# Patient Record
Sex: MALE | Race: WHITE | Employment: OTHER | URBAN - METROPOLITAN AREA
[De-identification: names, ages, dates, MRNs, and addresses within clinical notes are randomized per-mention and may not be internally consistent; named-entity substitution may affect disease eponyms.]

---

## 2017-09-12 ENCOUNTER — HOSPITAL ENCOUNTER (OUTPATIENT)
Dept: GENERAL RADIOLOGY | Age: 82
Discharge: HOME | End: 2017-09-12
Attending: SPECIALIST
Payer: MEDICARE

## 2017-09-12 DIAGNOSIS — R13.10 DYSPHAGIA: ICD-10-CM

## 2017-09-12 PROCEDURE — 74220 X-RAY XM ESOPHAGUS 1CNTRST: CPT

## 2017-09-15 ENCOUNTER — HOSPITAL ENCOUNTER (OUTPATIENT)
Dept: GENERAL RADIOLOGY | Age: 82
Discharge: HOME | End: 2017-09-15
Attending: SPECIALIST
Payer: MEDICARE

## 2017-09-15 DIAGNOSIS — R13.12 OROPHARYNGEAL DYSPHAGIA: ICD-10-CM

## 2017-09-15 DIAGNOSIS — R13.10 DYSPHAGIA: ICD-10-CM

## 2017-09-15 PROCEDURE — G8997 SWALLOW GOAL STATUS: HCPCS

## 2017-09-15 PROCEDURE — 92611 MOTION FLUOROSCOPY/SWALLOW: CPT

## 2017-09-15 PROCEDURE — G8996 SWALLOW CURRENT STATUS: HCPCS

## 2017-09-15 PROCEDURE — 74230 X-RAY XM SWLNG FUNCJ C+: CPT

## 2017-09-15 PROCEDURE — G8998 SWALLOW D/C STATUS: HCPCS

## 2017-09-15 NOTE — PROGRESS NOTES
[address]  [address] [facility], [address]    Speech Pathology Modified barium swallow Study with cms g codes  Patient: Waldemar Manzo (60 y.o. male)  Date: 9/15/2017  Referring Provider:  Hany Pascal    SUBJECTIVE:   Patient had minimal concerns about his swallowing. He admitted that sometimes he chokes on foods, but was unable to quantify frequency of choking. OBJECTIVE:   Past Medical History:   Past Medical History:   Diagnosis Date    Abdominal aneurysm without mention of rupture ([address] 4/11/2011    Cancer ([address]     skin ca    CKD (chronic kidney disease), stage III 8/25/2012    GERD (gastroesophageal reflux disease) 8/3/2011    Other and unspecified hyperlipidemia 4/11/2011    Pre-operative cardiovascular examination 4/11/2011    Unspecified essential hypertension 4/11/2011     Past Surgical History:   Procedure Laterality Date    HX CHOLECYSTECTOMY      NEUROLOGICAL PROCEDURE UNLISTED      subddural hematoma 2014 [facility]    VASCULAR SURGERY PROCEDURE UNLIST      [facility] ENDOGRAFT     Current Dietary Status:  Regular, thins  Radiologist: Ronald Lazo  Film Views: Lateral  Patient Position: upright in Hausted chair    Trial 1: Trial 2:   Consistency Presented: Thin liquid;Puree; Solid;Pill/Tablet     How Presented: Self-fed/presented;Cup/gulp; Spoon;Straw;Successive swallows      ORAL PHASE:      Bolus Acceptance: No impairment     Bolus Formation/Control: Impaired: Piecemeal (wiht purees and solids)  :           Oral Residue: Lingual (due to piecemeal deglutition)   PHARYNGEAL PHASE:      Initiation of Swallow: Triggered at vallecula (almost to pyriform sinuses with thins)     Timing: Pooling 1-5 sec     Penetration: Trace;During swallow (due to mild delay in swallow), Patient with delayed throat clearing with thins. Aspiration/Timing:  (aspirated thins in moderate amounts when trying to take a pill with water due to incoordinaton of pills vs water). Patient had coughing s/p aspiration. Patient taking large amounts of water with pill, he filled the valleculae with water and tilted his head back. This probably increased his aspiration risk. Pharyngeal Clearance: Vallecular residue;Pyriform residue ;10-50% (moderate -worse with purees, solids. poor awareness of pharyngeal residue; also wiht mild posterior pharyngeal wall residue)   Residue was worse in pyriform than valleculae and cleared less from pyriforms with liquids. Patient had poor awareness of pharyngeal residue     Attempted Modifications: Alternate liquids/solids     Effective Modifications: Alternate liquids/solids (helped reduce, but did not eliminate )     Cues for Modifications: Minimal-moderate             Trial 3: Trial 4:    ESOPHAGEAL PHASE:   He had barium swallow 9-12-17, which showed moderate reflux. This is an additional aspiration risk factor for him and may increase his sensory issues in pharynx. :    :                                                                        Decreased Tongue Base Retraction?: Yes  Laryngeal Elevation: Reduced excursion with laryngeal vestibule gap; Incomplete laryngeal closure  Aspiration/Penetration Score: 7 (Aspiration-Contrast passes below the cords/, but is not ejected despite attempt)  Pharyngeal Symmetry: Symmetrical  Pharyngeal-Esophageal Segment: Decreased relaxation of upper esophageal segment (some of this may be due to impaired subglottic pressure, from reduced laryngeal elevation and excursion)  Pharyngeal Dysfunction: Decreased tongue base retraction;Decreased pharyngeal wall constriction;Decreased strength            In compliance with CMSs Claims Based Outcome Reporting, the following G-code set was chosen for this patient based the use of the NOMS functional outcome to quantify this patient's level of swallowing impairment.     Using the NOMS, the patient was determined to be at level 5 for swallow function which correlates with the CJ= 20-39% level of severity. Based on the objective assessment provided within this note, the current, goal, and discharge g-codes are as follows:    Swallow  Swallowing:   Swallow Current Status CJ= 20-39%   Swallow Goal Status CJ= 20-39%   Swallow D/C Status CJ= 20-39%        NOMS Swallowing Levels:  Level 1 (CN): NPO  Level 2 (CM): NPO but takes consistency in therapy  Level 3 (CL): Takes less than 50% of nutrition p.o. and continues with nonoral feedings; and/or safe with mod cues; and/or max diet restriction  Level 4 (CK): Safe swallow but needs mod cues; and/or mod diet restriction; and/or still requires some nonoral feeding/supplements  Level 5 (CJ): Safe swallow with min diet restriction; and/or needs min cues  Level 6 (CI): Independent with p.o.; rare cues; usually self cues; may need to avoid some foods or needs extra time  Level 7 ([address] [place] [address]): Independent for all p.o.  RAVEN. (2003). National Outcomes Measurement System (NOMS): Adult Speech-Language Pathology User's Guide. ASSESSMENT :  Based on the objective data described above, the patient presents with moderate pharyngeal dysphagia with mild delay and moderate weakness. Pharyngeal residue was moderate to severe with thicker items and increased his aspiration risk. He aspirated on thin liquids when taking a pills, as he had difficulty coordinating the swallow of a liquids and solid at the same time. He had coughing with aspiration, but poor pharyngeal sensation for pharyngeal residue. Cup sipping was an increased aspiration risk over straw sips. He had mild oral propulsion issues as well with piecemeal deglutition. Documented h/o reflux complicates aspiration risk as well. .  He had poor awareness of dysphagia and minimal concern for choking. PLAN/RECOMMENDATIONS :  Diet as tolerated. He should use straw instead of cup sips. He should not drink with food in the mouth.    He should alternate swallows of drinks and foods. HE needs OP SLP for swallowing therapy to address strength of swallow (BOT,laryngeal elevation), safe swallowing strategies, education about aspiration risks, family education. COMMUNICATION/EDUCATION:   The above findings and recommendations were discussed with: patient who verbalized reduced understanding of his aspiration risks  .     Thank you for this referral.Suki Ann, SLP  Time Calculation: 20 mins

## 2022-11-17 ENCOUNTER — TRANSCRIBE ORDER (OUTPATIENT)
Dept: SCHEDULING | Age: 87
End: 2022-11-17

## 2022-11-17 DIAGNOSIS — R13.12 DYSPHAGIA, OROPHARYNGEAL PHASE: Primary | ICD-10-CM

## 2022-11-17 DIAGNOSIS — K22.4 ESOPHAGEAL DYSMOTILITY: ICD-10-CM

## 2022-12-12 ENCOUNTER — HOSPITAL ENCOUNTER (OUTPATIENT)
Dept: GENERAL RADIOLOGY | Age: 87
Discharge: HOME | End: 2022-12-12
Attending: SPECIALIST
Payer: MEDICARE

## 2022-12-12 DIAGNOSIS — K22.4 ESOPHAGEAL DYSMOTILITY: ICD-10-CM

## 2022-12-12 DIAGNOSIS — R13.12 DYSPHAGIA, OROPHARYNGEAL PHASE: ICD-10-CM

## 2022-12-12 PROCEDURE — 92611 MOTION FLUOROSCOPY/SWALLOW: CPT

## 2022-12-12 PROCEDURE — 74230 X-RAY XM SWLNG FUNCJ C+: CPT

## 2022-12-12 NOTE — PROGRESS NOTES
[facility]  [address], [address] [facility] STUDY  Patient: Sohail Barcenas (42 y.o. male)  Date: 12/12/2022  Referring Provider:  Christina Myles:   Patient's caregiver and wife attended session. They report increasing dysphagia. They just started taking away bread and meats b/c of too much choking. He has lost 20 lbs in the last year. They already provide protein shakes. SLP completed an MBS in 2017, which already showed significant dysphagia:  2017 note:  ASSESSMENT :  Based on the objective data described above, the patient presents with moderate pharyngeal dysphagia with mild delay and moderate weakness. Pharyngeal residue was moderate to severe with thicker items and increased his aspiration risk. He aspirated on thin liquids when taking a pills, as he had difficulty coordinating the swallow of a liquids and solid at the same time. He had coughing with aspiration, but poor pharyngeal sensation for pharyngeal residue. Cup sipping was an increased aspiration risk over straw sips. He had mild oral propulsion issues as well with piecemeal deglutition. Documented h/o reflux complicates aspiration risk as well. .  He had poor awareness of dysphagia and minimal concern for choking. PLAN/RECOMMENDATIONS :  Diet as tolerated. He should use straw instead of cup sips. He should not drink with food in the mouth. He should alternate swallows of drinks and foods. HE needs OP SLP for swallowing therapy to address strength of swallow (BOT,laryngeal elevation), safe swallowing strategies, education about aspiration risks, family education.       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      OBJECTIVE:   Past Medical History:   Past Medical History:   Diagnosis Date Abdominal aneurysm without mention of rupture ([facility] 4/11/2011    Cancer ([facility]     skin ca    CKD (chronic kidney disease), stage III 8/25/2012    GERD (gastroesophageal reflux disease) 8/3/2011    Other and unspecified hyperlipidemia 4/11/2011    Pre-operative cardiovascular examination 4/11/2011    Unspecified essential hypertension 4/11/2011     Past Surgical History:   Procedure Laterality Date    HX CHOLECYSTECTOMY      NEUROLOGICAL PROCEDURE UNLISTED      [facility]    VASCULAR SURGERY PROCEDURE UNLIST      [facility] ENDOGRAFT     Current Dietary Status:  soft, thins  Radiologist: upright in bed  Film Views: Lateral  Patient Position: upright in Hausted chair    Trial 1: Trial 2:   Consistency Presented: Thin liquid;Puree; Solid; Nectar thick liquid;Pill/Tablet; Honey thick liquid     How Presented: SLP-fed/presented;Spoon;Straw;Successive swallows      ORAL PHASE:      Bolus Acceptance: No impairment     Bolus Formation/Control: No impairment:    :     Propulsion: No impairment     Oral Residue: None  PHARYNGEAL PHASE:      Initiation of Swallow: Triggered at pyriform sinus(es)     Timing: Pooling 1-5 sec     Penetration:  (deep pentration in large amounts with thin liquids to the cords. a little stayed on the cords). Also had penetration after the cords from pharyngeal/vallecular residue  in trace amounts  He had some throat clearing reaction to penetration, which cleared 95% of it     Aspiration/Timing:  (trace amounts of aspiration intermittently from deep penetration with thins or from residue in valleculae with thicker items. ) More aspiration with thins than nectars. Pharyngeal Clearance: Vallecular residue;Pyriform residue with purees, he had severe residue throughout the pharynx in both upper or lower. He has reduced coordination of pharyngeal peristalsis with some to and fro in pharynx. Less residue with honey thick liquids than purees. Attempted Modifications:  Alternate liquids/solids; Chin tuck;Effortful swallow;Left head turn;Right head turn;Small sips and bites;Straw                         Trial 3: Trial 4:                            :    :                                                                        Decreased Tongue Base Retraction?: Yes  Laryngeal Elevation: Inadequate epiglottic inversion; Reduced excursion with laryngeal vestibule gap  Aspiration/Penetration Score: 8 (Aspiration-Contrast passes cords/glottis with no effort to eject, ie/silent aspiration)  Pharyngeal Symmetry: Not assessed  Pharyngeal-Esophageal Segment: No impairment               ASSESSMENT :  Based on the objective data described above, the patient presents with moderate to severe pharyngeal dysphagia. HE had aspiration in trace to small amounts with thins. Unfortunately aspiration was silent due to poor sensation. He has aspiration due to delay in swallow and reduced airway closure. He has weakness in swallow with pharyngeal residue that was worse with thicker consistencies. Noted poor pharyngeal peristalsis, reduced BOT retraction, reduced laryngeal elevation and excursion. Poor sensation for pharyngeal residue resulted in aspiration of residue in trace amounts. He swallow thinned purees and mildly thick liquids best on this study. These results are only slightly worse than 5 years ago. Patient has apparently managed trace aspiration for the last 5 years without repetitive aspiration pneumonia. Family considering hospice. Marcie Crumble PLAN/RECOMMENDATIONS :  Try thinned purees, and mildly thick liquids. Consider goals of care with physician input. COMMUNICATION/EDUCATION:   The above findings and recommendations were discussed with:  patient's wife and caregiver  who verbalized understanding.     Thank you for this referral.  Jamshid Dewitt, SLP  Time Calculation: 15 mins

## 2023-05-17 RX ORDER — PRAVASTATIN SODIUM 10 MG
1 TABLET ORAL NIGHTLY
COMMUNITY
Start: 2018-01-22

## 2023-05-17 RX ORDER — LOSARTAN POTASSIUM AND HYDROCHLOROTHIAZIDE 12.5; 1 MG/1; MG/1
TABLET ORAL
COMMUNITY
Start: 2016-03-20

## 2023-05-17 RX ORDER — OMEPRAZOLE 20 MG/1
CAPSULE, DELAYED RELEASE ORAL
COMMUNITY
Start: 2016-10-27

## 2023-05-17 RX ORDER — ASPIRIN 81 MG/1
TABLET ORAL DAILY
COMMUNITY

## 2023-05-17 RX ORDER — ZOLPIDEM TARTRATE 5 MG/1
5 TABLET ORAL
COMMUNITY
Start: 2014-03-13

## 2023-05-17 RX ORDER — FENOFIBRATE 134 MG/1
CAPSULE ORAL
COMMUNITY
Start: 2015-03-15